# Patient Record
Sex: FEMALE
[De-identification: names, ages, dates, MRNs, and addresses within clinical notes are randomized per-mention and may not be internally consistent; named-entity substitution may affect disease eponyms.]

---

## 2018-08-02 ENCOUNTER — HOSPITAL ENCOUNTER (OUTPATIENT)
Dept: HOSPITAL 5 - OR | Age: 78
Discharge: HOME | End: 2018-08-02
Attending: SPECIALIST
Payer: MEDICARE

## 2018-08-02 VITALS — SYSTOLIC BLOOD PRESSURE: 168 MMHG | DIASTOLIC BLOOD PRESSURE: 89 MMHG

## 2018-08-02 DIAGNOSIS — Z85.3: ICD-10-CM

## 2018-08-02 DIAGNOSIS — Z90.13: ICD-10-CM

## 2018-08-02 DIAGNOSIS — Z98.42: ICD-10-CM

## 2018-08-02 DIAGNOSIS — Z88.5: ICD-10-CM

## 2018-08-02 DIAGNOSIS — Z98.890: ICD-10-CM

## 2018-08-02 DIAGNOSIS — Z90.710: ICD-10-CM

## 2018-08-02 DIAGNOSIS — E78.00: ICD-10-CM

## 2018-08-02 DIAGNOSIS — I10: ICD-10-CM

## 2018-08-02 DIAGNOSIS — H26.492: Primary | ICD-10-CM

## 2018-08-02 DIAGNOSIS — Z90.49: ICD-10-CM

## 2018-08-02 DIAGNOSIS — Z98.41: ICD-10-CM

## 2018-08-02 DIAGNOSIS — M13.862: ICD-10-CM

## 2018-08-02 DIAGNOSIS — M13.861: ICD-10-CM

## 2018-08-16 ENCOUNTER — HOSPITAL ENCOUNTER (OUTPATIENT)
Dept: HOSPITAL 5 - OR | Age: 78
Discharge: HOME | End: 2018-08-16
Attending: SPECIALIST
Payer: MEDICARE

## 2018-08-16 VITALS — DIASTOLIC BLOOD PRESSURE: 89 MMHG | SYSTOLIC BLOOD PRESSURE: 157 MMHG

## 2018-08-16 DIAGNOSIS — Z90.710: ICD-10-CM

## 2018-08-16 DIAGNOSIS — Z98.890: ICD-10-CM

## 2018-08-16 DIAGNOSIS — Z98.41: ICD-10-CM

## 2018-08-16 DIAGNOSIS — Z79.899: ICD-10-CM

## 2018-08-16 DIAGNOSIS — E78.00: ICD-10-CM

## 2018-08-16 DIAGNOSIS — Z90.49: ICD-10-CM

## 2018-08-16 DIAGNOSIS — Z88.5: ICD-10-CM

## 2018-08-16 DIAGNOSIS — Z90.13: ICD-10-CM

## 2018-08-16 DIAGNOSIS — Z85.3: ICD-10-CM

## 2018-08-16 DIAGNOSIS — I10: ICD-10-CM

## 2018-08-16 DIAGNOSIS — H26.491: Primary | ICD-10-CM

## 2018-08-16 DIAGNOSIS — M19.90: ICD-10-CM

## 2018-08-16 DIAGNOSIS — Z98.42: ICD-10-CM

## 2022-04-11 ENCOUNTER — LAB OUTSIDE AN ENCOUNTER (OUTPATIENT)
Dept: URBAN - NONMETROPOLITAN AREA CLINIC 13 | Facility: CLINIC | Age: 82
End: 2022-04-11

## 2022-04-11 ENCOUNTER — OFFICE VISIT (OUTPATIENT)
Dept: URBAN - NONMETROPOLITAN AREA CLINIC 13 | Facility: CLINIC | Age: 82
End: 2022-04-11
Payer: MEDICARE

## 2022-04-11 ENCOUNTER — DASHBOARD ENCOUNTERS (OUTPATIENT)
Age: 82
End: 2022-04-11

## 2022-04-11 ENCOUNTER — WEB ENCOUNTER (OUTPATIENT)
Dept: URBAN - NONMETROPOLITAN AREA CLINIC 13 | Facility: CLINIC | Age: 82
End: 2022-04-11

## 2022-04-11 DIAGNOSIS — K62.5 RECTAL BLEEDING: ICD-10-CM

## 2022-04-11 DIAGNOSIS — Z86.010 PERSONAL HISTORY OF COLONIC POLYPS: ICD-10-CM

## 2022-04-11 DIAGNOSIS — R01.1 HEART MURMUR: ICD-10-CM

## 2022-04-11 DIAGNOSIS — R19.5 LOOSE STOOLS: ICD-10-CM

## 2022-04-11 PROBLEM — 428283002: Status: ACTIVE | Noted: 2022-04-11

## 2022-04-11 PROCEDURE — 99204 OFFICE O/P NEW MOD 45 MIN: CPT | Performed by: NURSE PRACTITIONER

## 2022-04-11 RX ORDER — METOPROLOL SUCCINATE 50 MG/1
1 TABLET TABLET, FILM COATED, EXTENDED RELEASE ORAL
Status: ACTIVE | COMMUNITY

## 2022-04-11 RX ORDER — ATORVASTATIN CALCIUM 20 MG/1
1 TABLET TABLET, FILM COATED ORAL ONCE A DAY
Status: ACTIVE | COMMUNITY

## 2022-04-11 NOTE — HPI-TODAY'S VISIT:
4/11/22 Patient is a very pleasant 81 YO F who is referred by Dr. Jann De Leon for hematochezia. A copy of this consultation will be forwarded to the referring provider. She reports a single episode of hematochezia about 3 weeks ago. No abdominal pain or rectal pain assoiated with this. She denies anemia. There was significant brb in the toilet bowl but no stool. Bleeding resolved within about 10 minutes and next bowel movement was normal.  She last had colonoscopy in 2015 in [place] with findings of 1 TA, 1 hyperplastic polyp, sigmoid diverticulosis, and internal hemorrhoids with recommendation to repeat in 5 years.  She has had hemorrhoid banding in the past. At the last colonoscopy the hemorrhoid were too large to band. She delined CRS referral as she has  no pain or bleeding until her recent episode.  She reports 3 loose soft stools most days, at times stools are more formed. She occasionally has watery stools. Denies constipation. TG

## 2022-04-11 NOTE — PHYSICAL EXAM GASTROINTESTINAL
Abdomen  , soft, nontender, nondistended , no masses palpable , normal bowel sounds , no hepatomegaly present  , Rectal , normal sphincter tone , no internal hemorrhoids, rectal masses or bleeding present, small hard stool in rectal vault.

## 2022-05-31 ENCOUNTER — TELEPHONE ENCOUNTER (OUTPATIENT)
Dept: URBAN - NONMETROPOLITAN AREA CLINIC 2 | Facility: CLINIC | Age: 82
End: 2022-05-31

## 2022-06-06 PROBLEM — 12063002 RECTAL BLEEDING: Status: ACTIVE | Noted: 2022-06-06

## 2022-07-19 ENCOUNTER — OFFICE VISIT (OUTPATIENT)
Dept: URBAN - NONMETROPOLITAN AREA SURGERY CENTER 1 | Facility: SURGERY CENTER | Age: 82
End: 2022-07-19

## 2022-08-10 ENCOUNTER — OFFICE VISIT (OUTPATIENT)
Dept: URBAN - NONMETROPOLITAN AREA CLINIC 13 | Facility: CLINIC | Age: 82
End: 2022-08-10

## 2022-08-15 ENCOUNTER — OFFICE VISIT (OUTPATIENT)
Dept: URBAN - NONMETROPOLITAN AREA CLINIC 13 | Facility: CLINIC | Age: 82
End: 2022-08-15

## 2023-02-27 ENCOUNTER — OFFICE VISIT (OUTPATIENT)
Dept: URBAN - NONMETROPOLITAN AREA CLINIC 13 | Facility: CLINIC | Age: 83
End: 2023-02-27

## 2023-06-13 ENCOUNTER — OFFICE VISIT (OUTPATIENT)
Dept: URBAN - NONMETROPOLITAN AREA CLINIC 2 | Facility: CLINIC | Age: 83
End: 2023-06-13

## 2024-08-20 ENCOUNTER — OFFICE VISIT (OUTPATIENT)
Dept: URBAN - NONMETROPOLITAN AREA CLINIC 13 | Facility: CLINIC | Age: 84
End: 2024-08-20
Payer: MEDICARE

## 2024-08-20 ENCOUNTER — LAB OUTSIDE AN ENCOUNTER (OUTPATIENT)
Dept: URBAN - NONMETROPOLITAN AREA CLINIC 13 | Facility: CLINIC | Age: 84
End: 2024-08-20

## 2024-08-20 VITALS
SYSTOLIC BLOOD PRESSURE: 150 MMHG | DIASTOLIC BLOOD PRESSURE: 81 MMHG | HEIGHT: 61 IN | WEIGHT: 161.6 LBS | BODY MASS INDEX: 30.51 KG/M2 | HEART RATE: 59 BPM

## 2024-08-20 DIAGNOSIS — Z86.010 PERSONAL HISTORY OF COLONIC POLYPS: ICD-10-CM

## 2024-08-20 DIAGNOSIS — K62.5 RECTAL BLEEDING: ICD-10-CM

## 2024-08-20 DIAGNOSIS — R19.5 LOOSE STOOLS: ICD-10-CM

## 2024-08-20 DIAGNOSIS — R01.1 HEART MURMUR: ICD-10-CM

## 2024-08-20 PROCEDURE — 99243 OFF/OP CNSLTJ NEW/EST LOW 30: CPT | Performed by: NURSE PRACTITIONER

## 2024-08-20 PROCEDURE — 99213 OFFICE O/P EST LOW 20 MIN: CPT | Performed by: NURSE PRACTITIONER

## 2024-08-20 RX ORDER — METOPROLOL SUCCINATE 50 MG/1
1 TABLET TABLET, FILM COATED, EXTENDED RELEASE ORAL
Status: ACTIVE | COMMUNITY

## 2024-08-20 RX ORDER — ERGOCALCIFEROL 1.25 MG/1
TAKE 1 CAPSULE BY MOUTH EVERY 4 (FOUR) WEEKS. LABWORK REQUIRED FOR REFILLS CAPSULE, LIQUID FILLED ORAL
Qty: 6 EACH | Refills: 0 | Status: ACTIVE | COMMUNITY

## 2024-08-20 RX ORDER — ATORVASTATIN CALCIUM 20 MG/1
1 TABLET TABLET, FILM COATED ORAL ONCE A DAY
Status: ACTIVE | COMMUNITY

## 2024-08-20 NOTE — HPI-OTHER HISTORIES
4/11/22 Patient is a very pleasant 81 YO F who is referred by Dr. Jann De Leon for hematochezia. A copy of this consultation will be forwarded to the referring provider. She reports a single episode of hematochezia about 3 weeks ago. No abdominal pain or rectal pain assoiated with this. She denies anemia. There was significant brb in the toilet bowl but no stool. Bleeding resolved within about 10 minutes and next bowel movement was normal. She last had colonoscopy in 2015 in [place] with findings of 1 TA, 1 hyperplastic polyp, sigmoid diverticulosis, and internal hemorrhoids with recommendation to repeat in 5 years. She has had hemorrhoid banding in the past. At the last colonoscopy the hemorrhoid were too large to band. She delined CRS referral as she has no pain or bleeding until her recent episode. She reports 3 loose soft stools most days, at times stools are more formed. She occasionally has watery stools. Denies constipation. TG

## 2024-08-20 NOTE — HPI-TODAY'S VISIT:
Ms.Genny Diaz is an 84-year-old female who is referred to our office by Dr. Giron for rectal bleeding.  Mimi reports a one-time episode of bright red blood in the toilet.  This was not associated with constipation so it alarmed her.  She denies any weight loss or family history of colon cancer.  She continues to have daily soft stools with the use of fiber and a probiotic.  She did have an episode of rectal bleeding in 2022 was advised to undergo colonoscopy but this was unfortunately canceled.  Her last colonoscopy was in 2015 where tubular adenoma was removed and recommendation was to repeat in 5 years.  LG.

## 2024-09-04 ENCOUNTER — TELEPHONE ENCOUNTER (OUTPATIENT)
Dept: URBAN - NONMETROPOLITAN AREA CLINIC 13 | Facility: CLINIC | Age: 84
End: 2024-09-04

## 2024-12-13 ENCOUNTER — OFFICE VISIT (OUTPATIENT)
Dept: URBAN - METROPOLITAN AREA MEDICAL CENTER 1 | Facility: MEDICAL CENTER | Age: 84
End: 2024-12-13